# Patient Record
Sex: MALE | URBAN - METROPOLITAN AREA
[De-identification: names, ages, dates, MRNs, and addresses within clinical notes are randomized per-mention and may not be internally consistent; named-entity substitution may affect disease eponyms.]

---

## 2023-07-04 ENCOUNTER — OFFICE VISIT (OUTPATIENT)
Dept: URBAN - METROPOLITAN AREA CLINIC 48 | Facility: CLINIC | Age: 33
End: 2023-07-04
Payer: COMMERCIAL

## 2023-07-04 DIAGNOSIS — H10.9 CONJUNCTIVITIS: Primary | ICD-10-CM

## 2023-07-04 PROCEDURE — 99203 OFFICE O/P NEW LOW 30 MIN: CPT | Performed by: OPHTHALMOLOGY

## 2023-07-04 RX ORDER — MOXIFLOXACIN 5 MG/ML
0.5 % SOLUTION/ DROPS OPHTHALMIC
Qty: 5 | Refills: 1 | Status: ACTIVE
Start: 2023-07-04

## 2023-07-04 ASSESSMENT — INTRAOCULAR PRESSURE
OS: 17
OD: 18

## 2023-07-04 ASSESSMENT — KERATOMETRY
OS: 42.88
OD: 42.50

## 2023-07-04 NOTE — IMPRESSION/PLAN
Impression: Conjunctivitis: H10.9. Plan: Reviewed and discussed diagnosis in detail w/pt. will start course of ABX. May use Erythromycin QHS that was prescribed by ER

start: Moxifloxacin QID x 1 week RTC PRN